# Patient Record
(demographics unavailable — no encounter records)

---

## 2024-10-29 NOTE — HISTORY OF PRESENT ILLNESS
[FreeTextEntry1] : here for T2DM cb CVA, hypothyroid, HTN, HLD retired since 3/2020 sphenoidal meningioma s/p radiation therapy (5040 cGy) completed on 2/21/20 daughter (works in the pharmacy),  passed 12/2020 from cardiac arrest.  2023 son who is paralyzed due to an accident   interval history here with daughter labs reviewed 10/2024 wbc 10.7, a1c 7.3, darlyn neg, ldl 47, stable sCr 1.29, normal tfts  grandson goes to [employer], not trying to be orthopedist daughter studying abroad  regimen: ozempic 0.5mg weekly LT4 25mcg daily farxiga 10mg daily (still has but insurance is covering jardiance now) has humalog at home for when steroids are used b12 every other day  past meds: off trulicity due to shortage, off metformin- doesn't like pills, borderline gfr  FS in office 161 check 1x daily log reviewed, usually under 200s  diet: same. cheats every now and then, eating less veggies. not eating regularly. waiting too long to eat exercise: has herniated discs, limited  eye doctor: up to date, rabia DIETZ, 2021

## 2024-10-29 NOTE — PHYSICAL EXAM
[Alert] : alert [Well Nourished] : well nourished [Obese] : obese [No Acute Distress] : no acute distress [Well Developed] : well developed [Normal Sclera/Conjunctiva] : normal sclera/conjunctiva [EOMI] : extra ocular movement intact [No Proptosis] : no proptosis [Normal Oropharynx] : the oropharynx was normal [Thyroid Not Enlarged] : the thyroid was not enlarged [No Thyroid Nodules] : no palpable thyroid nodules [No Respiratory Distress] : no respiratory distress [No Accessory Muscle Use] : no accessory muscle use [Clear to Auscultation] : lungs were clear to auscultation bilaterally [Normal S1, S2] : normal S1 and S2 [Normal Rate] : heart rate was normal [Regular Rhythm] : with a regular rhythm [Normal Bowel Sounds] : normal bowel sounds [Not Tender] : non-tender [Not Distended] : not distended [Soft] : abdomen soft [No Stigmata of Cushings Syndrome] : no stigmata of Cushings Syndrome [No Rash] : no rash [Normal Reflexes] : deep tendon reflexes were 2+ and symmetric [No Tremors] : no tremors [Oriented x3] : oriented to person, place, and time [Normal Affect] : the affect was normal [Normal Mood] : the mood was normal [Acanthosis Nigricans] : no acanthosis nigricans

## 2024-10-29 NOTE — HISTORY OF PRESENT ILLNESS
[FreeTextEntry1] : here for T2DM cb CVA, hypothyroid, HTN, HLD retired since 3/2020 sphenoidal meningioma s/p radiation therapy (5040 cGy) completed on 2/21/20 daughter (works in the pharmacy),  passed 12/2020 from cardiac arrest.  2023 son who is paralyzed due to an accident   interval history here with daughter labs reviewed 10/2024 wbc 10.7, a1c 7.3, darlyn neg, ldl 47, stable sCr 1.29, normal tfts  grandson goes to [employer], not trying to be orthopedist daughter studying abroad  regimen: ozempic 0.5mg weekly LT4 25mcg daily farxiga 10mg daily (still has but insurance is covering jardiance now) has humalog at home for when steroids are used b12 every other day  past meds: off trulicity due to shortage, off metformin- doesn't like pills, borderline gfr  FS in office 161 check 1x daily log reviewed, usually under 200s  diet: same. cheats every now and then, eating less veggies. not eating regularly. waiting too long to eat exercise: has herniated discs, limited  eye doctor: up to date, rabia DIETZ, 2021

## 2024-10-29 NOTE — HISTORY OF PRESENT ILLNESS
[FreeTextEntry1] : here for T2DM cb CVA, hypothyroid, HTN, HLD retired since 3/2020 sphenoidal meningioma s/p radiation therapy (5040 cGy) completed on 2/21/20 daughter (works in the pharmacy),  passed 12/2020 from cardiac arrest.  2023 son who is paralyzed due to an accident   interval history here with daughter labs reviewed 10/2024 wbc 10.7, a1c 7.3, darlyn neg, ldl 47, stable sCr 1.29, normal tfts  grandson goes to [employer], not trying to be orthopedist daughter studying abroad  regimen: ozempic 0.5mg weekly LT4 25mcg daily farxiga 10mg daily (still has but insurance is covering jardiance now) has humalog at home for when steroids are used b12 every other day  past meds: off trulicity due to shortage, off metformin- doesn't like pills, borderline gfr  FS in office 161 check 1x daily log reviewed, usually under 200s  diet: same. cheats every now and then, eating less veggies. not eating regularly. waiting too long to eat exercise: has herniated discs, limited  eye doctor: up to date, rabia DIETZ, 2021

## 2024-10-29 NOTE — REASON FOR VISIT
[Follow-Up: _____] : a [unfilled] follow-up visit [Spouse] : spouse [FreeTextEntry1] : T2DM, HLD, HTN, obesity, hypothyroidism, B12 deficiency

## 2024-10-29 NOTE — ASSESSMENT
[FreeTextEntry1] : 77F, obese w/ meningioma s/p XRT (as patient had L optic nerve compression), T2DM cb CKD3/CVA, osteoporosis, HTN, HLD, hypothyroidism and vitamin B12 deficiency rtc 4-6months will discuss osteoporosis with the patient needs to see new pain mgmt doctor  T2DM cb CKD- FS acceptable for age, borderline gfr for metformin, would not restart.  continue ozempic 0.5mg weekly continue farxiga 10mg daily (when switching to jardiance, take 2 tabs to be 20mg as the script was for 10mg) continue FS checks daily.  will let me know if FS is getting high.  needs to see eye doctor.  MUST drink more fluids  HTN- BP normotensive. continue ARB/BB  HLD- had issues with liptor. LDL improved, continue diet changes. on crestor plus zetia  Hypothyroidism- normal tfts. on low dose LT4.  Vitamin B12 deficiency- slightly high, continue B12 supplement every other day  Vitamin D def- continue vit d supplementation  Osteoporosis, Hyperparathyroidism w/ eucalcemia- had treatment in the past. bmd due in 2025. pth slightly high. discussed meds briefly, will discuss next time with the patient

## 2025-02-03 NOTE — ASSESSMENT
[FreeTextEntry1] : 77F, obese w/ meningioma s/p XRT (as patient had L optic nerve compression), T2DM cb CKD3/CVA, osteoporosis, HTN, HLD, hypothyroidism and vitamin B12 deficiency rtc 6months bmd 8/2025 had a lengthly discussion regarding depression etc. can try to say yes to something everyday, for which patient did agree to  T2DM cb CKD- FS acceptable for age, borderline gfr for metformin, would not restart.  continue ozempic 0.5mg weekly continue jardiance 25mg daily (farxiga not covered but having more UTIs with jardiance, will try to get farxiga again) continue FS checks daily.  will let me know if FS is getting high.  annual eye doctor visit MUST drink more fluids, still needs more  HTN- BP normotensive. continue ARB/BB  HLD- had issues with liptor. LDL improved, continue diet changes. on crestor plus zetia  Hypothyroidism- normal tfts. on low dose LT4.  Vitamin B12 deficiency- slightly high, continue B12 supplement every other day  Vitamin D def- continue vit d supplementation  Osteoporosis, Hyperparathyroidism w/ eucalcemia- had treatment in the past. bmd due in 2025. pth slightly high. discussed meds but will off on meds and decide depending on updated bmd

## 2025-02-03 NOTE — HISTORY OF PRESENT ILLNESS
[FreeTextEntry1] : here for T2DM cb CVA, hypothyroid, HTN, HLD retired since 3/2020 sphenoidal meningioma s/p radiation therapy (5040 cGy) completed on 2/21/20 daughter (works in the pharmacy),  passed 12/2020 from cardiac arrest.  2023 son who is paralyzed due to an accident   interval history here with daughter labs reviewed 1/2025 wbc 11.3, a1c 7.6, darlyn neg, ldl 51, stable sCr 1.29, normal tfts  grandson goes to [place], not trying to be orthopedist. will be finance granddaughter studying abroad but now back from [place]  had depression. does not have many hobbies. no SI/HI does not want to see therapisst  regimen: ozempic 0.5mg weekly LT4 25mcg daily jardiance 25mg daily- having more utis has humalog at home for when steroids are used b12 every other day  past meds: off trulicity due to shortage, off metformin- doesn't like pills, borderline gfr farxiga not covered  FS in office 167 check 1x daily log reviewed, usually under 200s  diet: same. cheats every now and then, eating less veggies. not eating regularly. waiting too long to eat exercise: has herniated discs, limited

## 2025-03-14 NOTE — HISTORY OF PRESENT ILLNESS
[FreeTextEntry1] : 77 year old female with a history of HTN, nonobstructive CAD, PVCs, asthma, GERD, obesity, DM2, HLD, and hypothyroidism, and brain tumor s/p radiation in 2019. She has a history of meningioma - which has been stable. She was in [facility] last 12/2022 for a "mini CVA"   UA was positive, antibiotics started in the hospital and she was sent home with cefpodoxime. Head MRI revealed acute to subacute ischemia; was started on aspirin. TTE with bubble study demonstrated the presence of an intact intra atrial septum.   She has been doing well from the cardiac perspective.  The exertional dyspnea and fatigue has improved.  She denies orthopnea, edema, chest pain, near syncope or syncope. She reports compliance with her medications and is back on Farxiga since she feels better on it.  LDLs well controlled on the Crestor and Zetia.

## 2025-03-14 NOTE — CARDIOLOGY SUMMARY
[de-identified] : 3/14/2025:  Sinus Rhythm  Low voltage in precordial leads. -Poor R-wave progression -may be secondary to pulmonary disease consider old anterior infarct. [de-identified] : 3/7/2025:  LVEF 62%, mild aortic stenosis  [de-identified] : 9/20/2021:  40% mild RCA disease [de-identified] : 6/1/2023:  increased tortuosity causing increased velocities bilaterally with no evidence of actual atherosclerosis

## 2025-03-14 NOTE — REVIEW OF SYSTEMS
[Feeling Fatigued] : feeling fatigued [Weight Loss (___ Lbs)] : [unfilled] ~Ulb weight loss [Negative] : Heme/Lymph [FreeTextEntry9] : back pain with sciatica and intermittent right leg weakness

## 2025-03-14 NOTE — ASSESSMENT
[FreeTextEntry1] : would meet with a diabetic nutritionist to help stabilize her sugars plan on a stress test with next visit in 6 months since she needs to rely on others for transportation

## 2025-04-01 NOTE — PHYSICAL EXAM

## 2025-04-01 NOTE — ASSESSMENT
[FreeTextEntry1] : This is a 78-year-old woman with history of stroke.  She is doing well without any recurrent symptoms.  She will continue taking aspirin as well as medications for hypertension and cholesterol.  Her goal would be for normal blood pressure and LDL under 70.  Regarding the meningioma she states she is due to have a follow-up scan as ordered by Dr. Cornejo.  I asked her to have the radiology facility notify me so I can assess the films.  I will see her back in 6 months for routine neurologic follow-up, sooner should the need arise.

## 2025-04-01 NOTE — CONSULT LETTER
[Dear  ___] : Dear  [unfilled], [Courtesy Letter:] : I had the pleasure of seeing your patient, [unfilled], in my office today. [Please see my note below.] : Please see my note below. [Consult Closing:] : Thank you very much for allowing me to participate in the care of this patient.  If you have any questions, please do not hesitate to contact me. [Sincerely,] : Sincerely, [FreeTextEntry3] : Oswald Irby M.D., Ph.D. DPN-N [facility] [place] Director, Division of Neurology Director, Comprehensive Stroke Center [facility]

## 2025-04-01 NOTE — HISTORY OF PRESENT ILLNESS
[FreeTextEntry1] : Initial office visit February 6, 2023: This is a 75-year-old woman who presents today for stroke.  She had a stroke that mostly involved memory and cognition at the end of December 2022.  MRI showed a acute to subacute stroke involving the splenium on the left.  She had no weakness or numbness from this.  She does have 2 meningiomas 1 of which affected her sense of smell and taste as well as vision.  She has had radiation for these.  She is being followed with serial imaging by Dr. Cornejo.  She is here today for evaluation of stroke with notable risk factors of hypertension, hyper lipidemia, diabetes and obesity.  She is currently wearing a heart monitor and is scheduled to have a MILDRED.  She is here today for neurologic evaluation.  Follow-up August 8, 2023: This is a 76-year-old woman who presents today with stroke.  She had a stroke at the end of December 2022 which affected memory.  She had no other neurologic symptoms and has no residual deficits.  She continues to have loss of sense, taste and vision due to meningioma.  She is addressing her stroke risk factors of hypertension hyperlipidemia and diabetes with medication.  She is here today for neurologic follow-up.  Follow-up March 18, 2024: This is a 77-year-old woman who presents with stroke.  She has had a stroke in December 2022.  Primarily her memory is affected.  She has some mild short-term memory loss.  She also had history of meningioma which is affected her ability to smell and taste.  She continues to take medication for secondary stroke prevention including aspirin as well as antihypertensives, Trulicity and a statin.  She reports that since her last visit she was hospitalized initially with pneumonia also with COVID and RSV.  She is here today for neurologic follow-up.  Follow-up September 30, 2024: This is a 77-year-old woman who presents today with history of stroke as well as memory loss.  She had a stroke in 2022 which affected her memory mostly.  She has had some mild progression since then.  Her daughter who accompanies her states that she is repetitive.  The patient herself states that if she is watching television switches channels she almost immediately forgets what she had previously been watching.  She has some inattention as well as memory loss.  She continues to control her hypertension diabetes and cholesterol with medication.  This is she is also taking daily baby aspirin.  She is here today for follow-up.  Follow-up April 1, 2025: This is a 78-year-old woman with a history of stroke as well as meningiomas.  She had her stroke about 3 years ago.  It had affected her memory and she has had mild progression since.  She does not really complain of it much today.  She does complain of some blurred vision intermittently for which she is seeing ophthalmology soon.  She has had no new stroke symptoms.  She is having MRI soon to follow-up for her meningiomas.  She went to [facility] in January of this year with a bout of vertigo.  She had a head CT which was negative for acute stroke.  Her meningioma was reported as stable.  She is here today for neurologic follow-up.

## 2025-04-18 NOTE — REASON FOR VISIT
[Routine Follow-Up] : routine follow-up visit for [Brain Metastasis] : brain metastasis [Other: _____] : [unfilled]

## 2025-04-25 NOTE — REVIEW OF SYSTEMS
[Visual Disturbances] : visual disturbances [Negative] : Allergic/Immunologic [FreeTextEntry3] : left eye, felt to be due to consequences of cataract surgery. [de-identified] : some decreased  balance issues and short term memory deficits that are apparently stable.

## 2025-04-25 NOTE — DISEASE MANAGEMENT
[Clinical] : TNM Stage: c [N/A] : Currently not applicable [TTNM] : x [NTNM] : x [MTNM] : x [de-identified] : 5040cGy [de-identified] : Brain: planum sphenoid and right parafalcine.

## 2025-04-25 NOTE — DISEASE MANAGEMENT
[Clinical] : TNM Stage: c [N/A] : Currently not applicable [TTNM] : x [NTNM] : x [MTNM] : x [de-identified] : 5040cGy [de-identified] : Brain: planum sphenoid and right parafalcine.

## 2025-04-25 NOTE — REVIEW OF SYSTEMS
[Visual Disturbances] : visual disturbances [Negative] : Allergic/Immunologic [FreeTextEntry3] : left eye, felt to be due to consequences of cataract surgery. [de-identified] : some decreased  balance issues and short term memory deficits that are apparently stable.

## 2025-04-25 NOTE — LETTER CLOSING
[Sincerely yours,] : Sincerely yours, [FreeTextEntry3] :  Ger Cornejo MD Physician in Chief Department of Radiation Medicine [facility]   of Radiation Medicine [employer] at  \[facility] [place] [facility]\""/[facility]  Radiation  [facility]/ [facility] at [place] [address]  Tel: [phone] Fax: [phone]

## 2025-04-25 NOTE — REVIEW OF SYSTEMS
[Visual Disturbances] : visual disturbances [Negative] : Allergic/Immunologic [FreeTextEntry3] : left eye, felt to be due to consequences of cataract surgery. [de-identified] : some decreased  balance issues and short term memory deficits that are apparently stable.

## 2025-04-25 NOTE — LETTER CLOSING
[Sincerely yours,] : Sincerely yours, [FreeTextEntry3] :  Ger Cornejo MD Physician in Chief Department of Radiation Medicine [facility]   of Radiation Medicine [employer] at  [facility]  Radiation  [facility]/ [facility] at [place] [address]  Tel: [phone] Fax: [phone]

## 2025-04-25 NOTE — DISEASE MANAGEMENT
[Clinical] : TNM Stage: c [N/A] : Currently not applicable [TTNM] : x [NTNM] : x [MTNM] : x [de-identified] : 5040cGy [de-identified] : Brain: planum sphenoid and right parafalcine.

## 2025-04-25 NOTE — VITALS
[Maximal Pain Intensity: 0/10] : 0/10 [NoTreatment Scheduled] : no treatment scheduled [80: Normal activity with effort; some signs or symptoms of disease.] : 80: Normal activity with effort; some signs or symptoms of disease.  [Least Pain Intensity: 0/10] : 0/10 [ECOG Performance Status: 1 - Restricted in physically strenuous activity but ambulatory and able to carry out work of a light or sedentary nature] : Performance Status: 1 - Restricted in physically strenuous activity but ambulatory and able to carry out work of a light or sedentary nature, e.g., light house work, office work

## 2025-04-25 NOTE — HISTORY OF PRESENT ILLNESS
[FreeTextEntry1] : Nicolle Sun is a 75 year-old female with planum sphenoidal meningioma. She is s/p radiation therapy (5040 cGy) completed on 2/21/20. She presents for routine follow-up.  4/4/2025 MRI brain with and without IV contrast Abnormal enhancement involving the inferior left frontal region is again identified. This finding is compatible with patient's known planum sphenoidale meningioma. This lesion measures approximately 1.6 x 2.1 cm and previously measured approximately 1.5 x 2.4 cm. No significant shift or herniation is seen  4/22/2025 -- Pt. returns today for follow up.  She continues to see Dr Irby (neuro)  Reports she went to ED in January 2025 for an episode of extreme dizziness.  She was prescribed meclizine.  She notes it has not happened since then.

## 2025-04-25 NOTE — LETTER CLOSING
[Sincerely yours,] : Sincerely yours, [FreeTextEntry3] :  Ger Cornejo MD Physician in Chief Department of Radiation Medicine [facility]   of Radiation Medicine [employer] at  [facility] [place]  Radiation  [facility]/ [facility] at [place] [address]  Tel: [phone] Fax: [phone]

## 2025-04-25 NOTE — LETTER GREETING
[Dear  ___] : Dear ~MAHAD, [Follow-Up] : Your patient, [unfilled] was seen in my office today for follow-up [Please see my note below.] : Please see my note below. [FreeTextEntry2] : Oswald Irby MD